# Patient Record
Sex: FEMALE | Race: OTHER | NOT HISPANIC OR LATINO | URBAN - METROPOLITAN AREA
[De-identification: names, ages, dates, MRNs, and addresses within clinical notes are randomized per-mention and may not be internally consistent; named-entity substitution may affect disease eponyms.]

---

## 2018-07-21 ENCOUNTER — INPATIENT (INPATIENT)
Facility: HOSPITAL | Age: 53
LOS: 3 days | Discharge: HOME | DRG: 313 | End: 2018-07-25
Attending: INTERNAL MEDICINE | Admitting: INTERNAL MEDICINE
Payer: MEDICAID

## 2018-07-21 VITALS
HEART RATE: 54 BPM | RESPIRATION RATE: 16 BRPM | HEIGHT: 68 IN | DIASTOLIC BLOOD PRESSURE: 75 MMHG | SYSTOLIC BLOOD PRESSURE: 156 MMHG | TEMPERATURE: 98 F | WEIGHT: 225.09 LBS | OXYGEN SATURATION: 98 %

## 2018-07-21 DIAGNOSIS — R07.9 CHEST PAIN, UNSPECIFIED: ICD-10-CM

## 2018-07-21 LAB
BASOPHILS # BLD AUTO: 0.1 K/UL — SIGNIFICANT CHANGE UP (ref 0–0.2)
BASOPHILS NFR BLD AUTO: 2.5 % — HIGH (ref 0–2)
CHLORIDE SERPL-SCNC: 109 MMOL/L — HIGH (ref 96–108)
EOSINOPHIL # BLD AUTO: 0.1 K/UL — SIGNIFICANT CHANGE UP (ref 0–0.5)
EOSINOPHIL NFR BLD AUTO: 2.7 % — SIGNIFICANT CHANGE UP (ref 0–6)
HCT VFR BLD CALC: 41.7 % — SIGNIFICANT CHANGE UP (ref 34.5–45)
HGB BLD-MCNC: 13.2 G/DL — SIGNIFICANT CHANGE UP (ref 11.5–15.5)
LYMPHOCYTES # BLD AUTO: 2.6 K/UL — SIGNIFICANT CHANGE UP (ref 1–3.3)
LYMPHOCYTES # BLD AUTO: 49 % — HIGH (ref 13–44)
MCHC RBC-ENTMCNC: 27.2 PG — SIGNIFICANT CHANGE UP (ref 27–34)
MCHC RBC-ENTMCNC: 31.8 GM/DL — LOW (ref 32–36)
MCV RBC AUTO: 85.5 FL — SIGNIFICANT CHANGE UP (ref 80–100)
MONOCYTES # BLD AUTO: 0.6 K/UL — SIGNIFICANT CHANGE UP (ref 0–0.9)
MONOCYTES NFR BLD AUTO: 11.1 % — SIGNIFICANT CHANGE UP (ref 2–14)
NEUTROPHILS # BLD AUTO: 1.9 K/UL — SIGNIFICANT CHANGE UP (ref 1.8–7.4)
NEUTROPHILS NFR BLD AUTO: 34.7 % — LOW (ref 43–77)
PLATELET # BLD AUTO: 263 K/UL — SIGNIFICANT CHANGE UP (ref 150–400)
POTASSIUM SERPL-MCNC: 3.9 MMOL/L — SIGNIFICANT CHANGE UP (ref 3.5–5.3)
POTASSIUM SERPL-SCNC: 3.9 MMOL/L — SIGNIFICANT CHANGE UP (ref 3.5–5.3)
RBC # BLD: 4.87 M/UL — SIGNIFICANT CHANGE UP (ref 3.8–5.2)
RBC # FLD: 12.1 % — SIGNIFICANT CHANGE UP (ref 10.3–14.5)
SODIUM SERPL-SCNC: 140 MMOL/L — SIGNIFICANT CHANGE UP (ref 135–145)
WBC # BLD: 5.4 K/UL — SIGNIFICANT CHANGE UP (ref 3.8–10.5)
WBC # FLD AUTO: 5.4 K/UL — SIGNIFICANT CHANGE UP (ref 3.8–10.5)

## 2018-07-21 PROCEDURE — 71045 X-RAY EXAM CHEST 1 VIEW: CPT | Mod: 26

## 2018-07-21 PROCEDURE — 99285 EMERGENCY DEPT VISIT HI MDM: CPT | Mod: 25

## 2018-07-21 RX ORDER — ASPIRIN/CALCIUM CARB/MAGNESIUM 324 MG
162 TABLET ORAL DAILY
Qty: 0 | Refills: 0 | Status: DISCONTINUED | OUTPATIENT
Start: 2018-07-21 | End: 2018-07-22

## 2018-07-21 RX ORDER — NITROGLYCERIN 6.5 MG
0.4 CAPSULE, EXTENDED RELEASE ORAL
Qty: 0 | Refills: 0 | Status: DISCONTINUED | OUTPATIENT
Start: 2018-07-21 | End: 2018-07-25

## 2018-07-21 RX ADMIN — Medication 0.4 MILLIGRAM(S): at 23:36

## 2018-07-21 RX ADMIN — Medication 162 MILLIGRAM(S): at 23:23

## 2018-07-21 NOTE — ED PROVIDER NOTE - OBJECTIVE STATEMENT
53 y/o F pt with no significant PMHx and no significant PSHx presents to ED c/o intermittent episodes of CP x1 week. Pt describes episodes as lasting anywhere from 15 minutes to 2 hours and radiating into her jaw. Pt notes her father  of MI at the age of 52. Pt reports [place], and is otherwise normal. Pt denies nausea, diaphoresis, pleuritic pain, fever, chills, cough, or any other complaints. NKDA

## 2018-07-22 DIAGNOSIS — Z29.9 ENCOUNTER FOR PROPHYLACTIC MEASURES, UNSPECIFIED: ICD-10-CM

## 2018-07-22 DIAGNOSIS — J45.909 UNSPECIFIED ASTHMA, UNCOMPLICATED: ICD-10-CM

## 2018-07-22 DIAGNOSIS — R07.9 CHEST PAIN, UNSPECIFIED: ICD-10-CM

## 2018-07-22 LAB
24R-OH-CALCIDIOL SERPL-MCNC: 29.1 NG/ML — LOW (ref 30–80)
ALBUMIN SERPL ELPH-MCNC: 3.7 G/DL — SIGNIFICANT CHANGE UP (ref 3.5–5)
ALBUMIN SERPL ELPH-MCNC: 3.9 G/DL — SIGNIFICANT CHANGE UP (ref 3.5–5)
ALP SERPL-CCNC: 81 U/L — SIGNIFICANT CHANGE UP (ref 40–120)
ALP SERPL-CCNC: 86 U/L — SIGNIFICANT CHANGE UP (ref 40–120)
ALT FLD-CCNC: 23 U/L DA — SIGNIFICANT CHANGE UP (ref 10–60)
ALT FLD-CCNC: 24 U/L DA — SIGNIFICANT CHANGE UP (ref 10–60)
ANION GAP SERPL CALC-SCNC: 6 MMOL/L — SIGNIFICANT CHANGE UP (ref 5–17)
ANION GAP SERPL CALC-SCNC: 8 MMOL/L — SIGNIFICANT CHANGE UP (ref 5–17)
AST SERPL-CCNC: 17 U/L — SIGNIFICANT CHANGE UP (ref 10–40)
AST SERPL-CCNC: 20 U/L — SIGNIFICANT CHANGE UP (ref 10–40)
BILIRUB SERPL-MCNC: 0.4 MG/DL — SIGNIFICANT CHANGE UP (ref 0.2–1.2)
BILIRUB SERPL-MCNC: 0.6 MG/DL — SIGNIFICANT CHANGE UP (ref 0.2–1.2)
BUN SERPL-MCNC: 12 MG/DL — SIGNIFICANT CHANGE UP (ref 7–18)
BUN SERPL-MCNC: 14 MG/DL — SIGNIFICANT CHANGE UP (ref 7–18)
CALCIUM SERPL-MCNC: 8.9 MG/DL — SIGNIFICANT CHANGE UP (ref 8.4–10.5)
CALCIUM SERPL-MCNC: 9.1 MG/DL — SIGNIFICANT CHANGE UP (ref 8.4–10.5)
CHLORIDE SERPL-SCNC: 109 MMOL/L — HIGH (ref 96–108)
CHOLEST SERPL-MCNC: 201 MG/DL — HIGH (ref 10–199)
CK MB BLD-MCNC: 0.9 % — SIGNIFICANT CHANGE UP (ref 0–3.5)
CK MB CFR SERPL CALC: 1.3 NG/ML — SIGNIFICANT CHANGE UP (ref 0–3.6)
CK SERPL-CCNC: 143 U/L — SIGNIFICANT CHANGE UP (ref 21–215)
CO2 SERPL-SCNC: 23 MMOL/L — SIGNIFICANT CHANGE UP (ref 22–31)
CO2 SERPL-SCNC: 27 MMOL/L — SIGNIFICANT CHANGE UP (ref 22–31)
CREAT SERPL-MCNC: 1.02 MG/DL — SIGNIFICANT CHANGE UP (ref 0.5–1.3)
CREAT SERPL-MCNC: 1.09 MG/DL — SIGNIFICANT CHANGE UP (ref 0.5–1.3)
FOLATE SERPL-MCNC: 15.3 NG/ML — SIGNIFICANT CHANGE UP
GLUCOSE SERPL-MCNC: 102 MG/DL — HIGH (ref 70–99)
GLUCOSE SERPL-MCNC: 114 MG/DL — HIGH (ref 70–99)
HBA1C BLD-MCNC: 5.9 % — HIGH (ref 4–5.6)
HDLC SERPL-MCNC: 41 MG/DL — SIGNIFICANT CHANGE UP (ref 40–125)
INR BLD: 1.05 RATIO — SIGNIFICANT CHANGE UP (ref 0.88–1.16)
LIPID PNL WITH DIRECT LDL SERPL: 148 MG/DL — SIGNIFICANT CHANGE UP
MAGNESIUM SERPL-MCNC: 2.4 MG/DL — SIGNIFICANT CHANGE UP (ref 1.6–2.6)
NT-PROBNP SERPL-SCNC: 38 PG/ML — SIGNIFICANT CHANGE UP (ref 0–125)
PHOSPHATE SERPL-MCNC: 3.9 MG/DL — SIGNIFICANT CHANGE UP (ref 2.5–4.5)
POTASSIUM SERPL-MCNC: 3.9 MMOL/L — SIGNIFICANT CHANGE UP (ref 3.5–5.3)
POTASSIUM SERPL-SCNC: 3.9 MMOL/L — SIGNIFICANT CHANGE UP (ref 3.5–5.3)
PROT SERPL-MCNC: 7.5 G/DL — SIGNIFICANT CHANGE UP (ref 6–8.3)
PROT SERPL-MCNC: 8.1 G/DL — SIGNIFICANT CHANGE UP (ref 6–8.3)
PROTHROM AB SERPL-ACNC: 11.5 SEC — SIGNIFICANT CHANGE UP (ref 9.8–12.7)
SODIUM SERPL-SCNC: 142 MMOL/L — SIGNIFICANT CHANGE UP (ref 135–145)
T3FREE SERPL-MCNC: 2.56 PG/ML — SIGNIFICANT CHANGE UP (ref 1.8–4.6)
T4 FREE SERPL-MCNC: 1.3 NG/DL — SIGNIFICANT CHANGE UP (ref 0.9–1.8)
TOTAL CHOLESTEROL/HDL RATIO MEASUREMENT: 4.9 RATIO — SIGNIFICANT CHANGE UP (ref 3.3–7.1)
TRIGL SERPL-MCNC: 59 MG/DL — SIGNIFICANT CHANGE UP (ref 10–149)
TROPONIN I SERPL-MCNC: <0.015 NG/ML — SIGNIFICANT CHANGE UP (ref 0–0.04)
TROPONIN I SERPL-MCNC: <0.015 NG/ML — SIGNIFICANT CHANGE UP (ref 0–0.04)
TSH SERPL-MCNC: 1.34 UU/ML — SIGNIFICANT CHANGE UP (ref 0.34–4.82)
VIT B12 SERPL-MCNC: 414 PG/ML — SIGNIFICANT CHANGE UP (ref 232–1245)

## 2018-07-22 RX ORDER — ATORVASTATIN CALCIUM 80 MG/1
40 TABLET, FILM COATED ORAL AT BEDTIME
Qty: 0 | Refills: 0 | Status: DISCONTINUED | OUTPATIENT
Start: 2018-07-22 | End: 2018-07-25

## 2018-07-22 RX ORDER — ASPIRIN/CALCIUM CARB/MAGNESIUM 324 MG
81 TABLET ORAL DAILY
Qty: 0 | Refills: 0 | Status: DISCONTINUED | OUTPATIENT
Start: 2018-07-22 | End: 2018-07-25

## 2018-07-22 RX ORDER — PANTOPRAZOLE SODIUM 20 MG/1
40 TABLET, DELAYED RELEASE ORAL
Qty: 0 | Refills: 0 | Status: DISCONTINUED | OUTPATIENT
Start: 2018-07-22 | End: 2018-07-25

## 2018-07-22 RX ORDER — IPRATROPIUM/ALBUTEROL SULFATE 18-103MCG
3 AEROSOL WITH ADAPTER (GRAM) INHALATION EVERY 6 HOURS
Qty: 0 | Refills: 0 | Status: DISCONTINUED | OUTPATIENT
Start: 2018-07-22 | End: 2018-07-25

## 2018-07-22 RX ORDER — METOPROLOL TARTRATE 50 MG
12.5 TABLET ORAL
Qty: 0 | Refills: 0 | Status: DISCONTINUED | OUTPATIENT
Start: 2018-07-22 | End: 2018-07-22

## 2018-07-22 RX ADMIN — PANTOPRAZOLE SODIUM 40 MILLIGRAM(S): 20 TABLET, DELAYED RELEASE ORAL at 07:40

## 2018-07-22 RX ADMIN — Medication 81 MILLIGRAM(S): at 12:20

## 2018-07-22 RX ADMIN — Medication 12.5 MILLIGRAM(S): at 05:25

## 2018-07-22 NOTE — ED ADULT NURSE NOTE - ED STAT RN HANDOFF DETAILS
Patient was endorsed to SAUL Ferguson in stable condition and no acute distress. Patient was endorsed to SAUL Ferguson in stable condition and no acute distress. MD Moreno was paged regarding dropped in HR. Patient is asymptomatic .

## 2018-07-22 NOTE — H&P ADULT - NSHPPHYSICALEXAM_GEN_ALL_CORE
Vital Signs Last 24 Hrs  T(C): 36.6 (22 Jul 2018 00:01), Max: 36.7 (21 Jul 2018 23:08)  T(F): 97.8 (22 Jul 2018 00:01), Max: 98 (21 Jul 2018 23:08)  HR: 48 (22 Jul 2018 00:01) (48 - 54)  BP: 113/47 (22 Jul 2018 00:01) (113/47 - 156/75)  BP(mean): --  RR: 16 (22 Jul 2018 00:01) (16 - 16)  SpO2: 100% (22 Jul 2018 00:01) (98% - 100%)

## 2018-07-22 NOTE — H&P ADULT - NSHPSOCIALHISTORY_GEN_ALL_CORE
patient lives in [facility] , [place] to [place] visit family, occasional drinker, non smoker and non drug user patient lives in [facility], has 3 kids, came to [place] family, occasional drinker, non smoker and non drug user

## 2018-07-22 NOTE — H&P ADULT - PROBLEM SELECTOR PLAN 3
IMPROVE VTE Individual Risk Assessment  RISK                                                                Points  [  ] Previous VTE                                                  3  [  ] Thrombophilia                                               2  [  ] Lower limb paralysis                                      2        (unable to hold up >15 seconds)    [  ] Current Cancer                                              2         (within 6 months)  [  ] Immobilization > 24 hrs                                1  [  ] ICU/CCU stay > 24 hours                              1  [  ] Age > 60                                                      1    IMPROVE VTE Score _0_______  no need for VTE prophylaxis

## 2018-07-22 NOTE — H&P ADULT - HISTORY OF PRESENT ILLNESS
53 y/o F ambulatory pt  with no significant PMHx and no significant PSHx presents to ED c/o intermittent episodes of CP x1 week. Pt describes episodes as lasting anywhere from 15 minutes to 2 hours and radiating into her jaw. Pt notes her father  of MI at the age of 52. Pt reports [place], and is otherwise normal. Pt denies nausea, diaphoresis, pleuritic pain, fever, chills, cough, or any other complaints. 51 y/o F ambulatory pt, originally from [facility] with PMHx of Asthma and HLD and no significant PSHx presents to ED c/o intermittent episodes of CP x1 week. Pt describes chest pain as on and off, lasting 15mins to 1 hr, burning in nature,10/10 initially now 4/10,  located in the centre of the chest and sometimes radiating into her jaw and left shoulder, non aggravating  with inspiration and strenuous activity. Patient pinpoints tender spot on the centre of the chest. Pt sometimes feels SOB, palpitations but not associated with pain. Pt denies nausea, diaphoresis,fever, chills, cough, or any other complaints.she had this kind of pain before for which she got her cardiac workup done in [place] including ECHO and stress test which were normal as per the Pt.Her father  of MI at the age of 52. Pt does not take any medications except for Vit D and inhaler pump. 53 y/o F ambulatory pt, originally from [facility] with PMHx of Asthma and HLD and no significant PSHx presents to ED c/o intermittent episodes of CP x1 week. Pt describes chest pain as on and off, lasting 15mins to 1 hr, burning in nature,10/10 initially now 4/10,  located in the centre of the chest and sometimes radiating into her jaw and left shoulder, non aggravating  with inspiration and strenuous activity. Patient pinpoints tender spot on the centre of the chest. Pt sometimes feels SOB, palpitations but not associated with pain. Pt denies nausea, diaphoresis,fever, chills, cough, or any other complaints.she had this kind of pain before for which she got her cardiac workup done in [place] including ECHO and stress test which were normal as per the Pt(no records though).Her father  of MI at the age of 52. Pt does not take any medications except for Vit D and inhaler pump.

## 2018-07-22 NOTE — H&P ADULT - MUSCULOSKELETAL
detailed exam no joint warmth/ROM intact/no joint swelling/no joint erythema/normal strength/no calf tenderness

## 2018-07-22 NOTE — H&P ADULT - PROBLEM SELECTOR PLAN 1
-R/O ACS  -Atypical chest pain, burning ,chest tenderness,  -ECG NSS with bradycardia  - 1 Trop I -ve   - started on  ASA 81 mg, low dose lopressor, Lipitor  -TELE monitoring  -F/u morning labs  - f/u ECHO -R/O ACS  -Atypical chest pain, burning ,chest tenderness,  -ECG NSR with bradycardia  - 1 Trop I -ve   - started on  ASA 81 mg, low dose lopressor, Lipitor  -TELE monitoring  -F/u morning labs  - f/u ECHO -R/O ACS  -Atypical chest pain, burning ,chest tenderness,  -ECG NSR with bradycardia  - 1 Trop I -ve   - started on  ASA 81 mg, low dose lopressor, Lipitor  -TELE monitoring  -F/u morning labs  - f/u ECHO  - Cardio Dr. Khalil

## 2018-07-22 NOTE — H&P ADULT - ASSESSMENT
51 y/o F ambulatory pt, originally from [facility] with PMHx of Asthma and HLD and no significant PSHx presents to ED c/o intermittent episodes of CP x1 week.  ED course; Pt stable    Vitals WNL  EKG : NSS with bradycardia  Chest Xray :  Aspirin 162 mg , nitroglycerin sublingual given 51 y/o F ambulatory pt, originally from [facility] with PMHx of Asthma and HLD and no significant PSHx presents to ED c/o intermittent episodes of CP x1 week.  ED course; Pt stable    Vitals WNL  EKG : NSR with bradycardia  Chest Xray :  Aspirin 162 mg , nitroglycerin sublingual given

## 2018-07-22 NOTE — CONSULT NOTE ADULT - SUBJECTIVE AND OBJECTIVE BOX
Requesting Physician : Dr. Paul    Reason for Consultation: Chest pain     HISTORY OF PRESENT ILLNESS:  51 yo F with history of asthma and HLD who is being evaluated for chest pain.  The patient was admitted with 1 week history of intermittent chest pain.  The pain is described as burning and sharp.  It is not related to exertion and is occasionally worse after eating.  She has had similar complaints in the past at which time an echo and NST were performed at an outside hospital. The patient was told it was normal.  No associated symptoms such as dyspnea, palpitations, diaphoresis, or n/v.        PAST MEDICAL & SURGICAL HISTORY:  Hyperlipidemia  Asthma  No significant past surgical history          MEDICATIONS:  MEDICATIONS  (STANDING):  aspirin  chewable 81 milliGRAM(s) Oral daily  atorvastatin 40 milliGRAM(s) Oral at bedtime  pantoprazole    Tablet 40 milliGRAM(s) Oral before breakfast      Allergies    No Known Allergies    Intolerances        FAMILY HISTORY:  Family history of MI (myocardial infarction) (Father): Father  of MI at 52    Non-contributary for premature coronary disease or sudden cardiac death    SOCIAL HISTORY:    [x ] Non-smoker  [ ] Smoker  [ ] Alcohol      REVIEW OF SYSTEMS:  [x ]chest pain  [  ]shortness of breath  [  ]palpitations  [  ]syncope  [ ]near syncope [ ]upper extremity weakness   [ ] lower extremity weakness  [  ]diplopia  [  ]altered mental status   [  ]fevers  [ ]chills [ ]nausea  [ ]vomitting  [  ]dysphagia    [ ]abdominal pain  [ ]melena  [ ]BRBPR    [  ]epistaxis  [  ]rash    [ ]lower extremity edema        [x ] All others negative	  [ ] Unable to obtain    PHYSICAL EXAM:  T(C): 36.9 (18 @ 07:28), Max: 36.9 (18 @ 07:28)  HR: 46 (18 @ 07:28) (45 - 85)  BP: 129/72 (18 @ 07:28) (113/47 - 156/75)  RR: 17 (18 @ 07:28) (16 - 17)  SpO2: 100% (18 @ 07:28) (98% - 100%)  Wt(kg): --  I&O's Summary        HEENT:   Normal oral mucosa, PERRL, EOMI	  Lymphatic: No lymphadenopathy , no edema  Cardiovascular: Normal S1 S2, RRR,No JVD, No murmurs , Peripheral pulses palpable 2+ bilaterally  Respiratory: Lungs clear to auscultation, normal effort 	  Gastrointestinal:  Soft, Non-tender, + BS	  Skin: No rashes, No ecchymoses, No cyanosis, warm to touch  Musculoskeletal: Normal range of motion, normal strength  Psychiatry:  Mood & affect appropriate      TELEMETRY: SR	    ECG:  SB	  RADIOLOGY:  OTHER:     DIAGNOSTIC TESTING:  [ ] Echocardiogram:  [ ]  Catheterization:  [ ] Stress Test:    	  	  LABS:	 	    CARDIAC MARKERS:  CARDIAC MARKERS ( 2018 06:01 )  <0.015 ng/mL / x     / 143 U/L / x     / 1.3 ng/mL  CARDIAC MARKERS ( 2018 23:38 )  <0.015 ng/mL / x     / x     / x     / x                                  13.2   5.4   )-----------( 263      ( 2018 23:38 )             41.7         142  |  109<H>  |  12  ----------------------------<  102<H>  3.9   |  27  |  1.02    Ca    9.1      2018 06:01  Phos  3.9       Mg     2.4         TPro  7.5  /  Alb  3.7  /  TBili  0.6  /  DBili  x   /  AST  17  /  ALT  23  /  AlkPhos  81      proBNP: Serum Pro-Brain Natriuretic Peptide: 38 pg/mL ( @ 06:01)    Lipid Profile:   HgA1c:   TSH: Thyroid Stimulating Hormone, Serum: 1.34 uU/mL ( @ 06:01)      ASSESSMENT/PLAN: 	52yFemale with history of HLD, asthma, FHx of CAD admitted with chest pain.   -Pain somewhat atypical and occurs usually after eating  -would olga lidia with 3 sets CE  -check TTE  -obtain prior NST  -further workup pending above    Anshu Price MD

## 2018-07-23 LAB
ALBUMIN SERPL ELPH-MCNC: 3.6 G/DL — SIGNIFICANT CHANGE UP (ref 3.5–5)
ALP SERPL-CCNC: 81 U/L — SIGNIFICANT CHANGE UP (ref 40–120)
ALT FLD-CCNC: 23 U/L DA — SIGNIFICANT CHANGE UP (ref 10–60)
ANION GAP SERPL CALC-SCNC: 8 MMOL/L — SIGNIFICANT CHANGE UP (ref 5–17)
AST SERPL-CCNC: 13 U/L — SIGNIFICANT CHANGE UP (ref 10–40)
BASOPHILS # BLD AUTO: 0.1 K/UL — SIGNIFICANT CHANGE UP (ref 0–0.2)
BASOPHILS NFR BLD AUTO: 1.4 % — SIGNIFICANT CHANGE UP (ref 0–2)
BILIRUB SERPL-MCNC: 0.8 MG/DL — SIGNIFICANT CHANGE UP (ref 0.2–1.2)
BUN SERPL-MCNC: 13 MG/DL — SIGNIFICANT CHANGE UP (ref 7–18)
CALCIUM SERPL-MCNC: 9.1 MG/DL — SIGNIFICANT CHANGE UP (ref 8.4–10.5)
CHLORIDE SERPL-SCNC: 107 MMOL/L — SIGNIFICANT CHANGE UP (ref 96–108)
CO2 SERPL-SCNC: 26 MMOL/L — SIGNIFICANT CHANGE UP (ref 22–31)
CREAT SERPL-MCNC: 1.04 MG/DL — SIGNIFICANT CHANGE UP (ref 0.5–1.3)
EOSINOPHIL # BLD AUTO: 0.2 K/UL — SIGNIFICANT CHANGE UP (ref 0–0.5)
EOSINOPHIL NFR BLD AUTO: 3.9 % — SIGNIFICANT CHANGE UP (ref 0–6)
GLUCOSE SERPL-MCNC: 97 MG/DL — SIGNIFICANT CHANGE UP (ref 70–99)
HCT VFR BLD CALC: 40.5 % — SIGNIFICANT CHANGE UP (ref 34.5–45)
HGB BLD-MCNC: 12.7 G/DL — SIGNIFICANT CHANGE UP (ref 11.5–15.5)
LYMPHOCYTES # BLD AUTO: 2.3 K/UL — SIGNIFICANT CHANGE UP (ref 1–3.3)
LYMPHOCYTES # BLD AUTO: 48.8 % — HIGH (ref 13–44)
MAGNESIUM SERPL-MCNC: 2.3 MG/DL — SIGNIFICANT CHANGE UP (ref 1.6–2.6)
MCHC RBC-ENTMCNC: 27.3 PG — SIGNIFICANT CHANGE UP (ref 27–34)
MCHC RBC-ENTMCNC: 31.4 GM/DL — LOW (ref 32–36)
MCV RBC AUTO: 86.8 FL — SIGNIFICANT CHANGE UP (ref 80–100)
MONOCYTES # BLD AUTO: 0.5 K/UL — SIGNIFICANT CHANGE UP (ref 0–0.9)
MONOCYTES NFR BLD AUTO: 10.8 % — SIGNIFICANT CHANGE UP (ref 2–14)
NEUTROPHILS # BLD AUTO: 1.6 K/UL — LOW (ref 1.8–7.4)
NEUTROPHILS NFR BLD AUTO: 35.1 % — LOW (ref 43–77)
PHOSPHATE SERPL-MCNC: 4.1 MG/DL — SIGNIFICANT CHANGE UP (ref 2.5–4.5)
PLATELET # BLD AUTO: 278 K/UL — SIGNIFICANT CHANGE UP (ref 150–400)
POTASSIUM SERPL-MCNC: 3.8 MMOL/L — SIGNIFICANT CHANGE UP (ref 3.5–5.3)
POTASSIUM SERPL-SCNC: 3.8 MMOL/L — SIGNIFICANT CHANGE UP (ref 3.5–5.3)
PROT SERPL-MCNC: 7.8 G/DL — SIGNIFICANT CHANGE UP (ref 6–8.3)
RBC # BLD: 4.66 M/UL — SIGNIFICANT CHANGE UP (ref 3.8–5.2)
RBC # FLD: 12 % — SIGNIFICANT CHANGE UP (ref 10.3–14.5)
SODIUM SERPL-SCNC: 141 MMOL/L — SIGNIFICANT CHANGE UP (ref 135–145)
WBC # BLD: 4.6 K/UL — SIGNIFICANT CHANGE UP (ref 3.8–10.5)
WBC # FLD AUTO: 4.6 K/UL — SIGNIFICANT CHANGE UP (ref 3.8–10.5)

## 2018-07-23 RX ORDER — ERGOCALCIFEROL 1.25 MG/1
50000 CAPSULE ORAL
Qty: 0 | Refills: 0 | Status: DISCONTINUED | OUTPATIENT
Start: 2018-07-23 | End: 2018-07-25

## 2018-07-23 RX ADMIN — PANTOPRAZOLE SODIUM 40 MILLIGRAM(S): 20 TABLET, DELAYED RELEASE ORAL at 07:18

## 2018-07-23 RX ADMIN — ERGOCALCIFEROL 50000 UNIT(S): 1.25 CAPSULE ORAL at 13:47

## 2018-07-23 RX ADMIN — Medication 81 MILLIGRAM(S): at 11:58

## 2018-07-23 RX ADMIN — ATORVASTATIN CALCIUM 40 MILLIGRAM(S): 80 TABLET, FILM COATED ORAL at 23:30

## 2018-07-23 NOTE — PROGRESS NOTE ADULT - SUBJECTIVE AND OBJECTIVE BOX
Patient is a 52y old  Female who presents with a chief complaint of chest pain (22 Jul 2018 12:56)/dizziness, on tele bradycardia, pending ECHO      INTERVAL HPI/OVERNIGHT EVENTS:  T(C): 36.8 (07-23-18 @ 07:47), Max: 37.2 (07-22-18 @ 15:44)  HR: 80 (07-23-18 @ 07:47) (46 - 80)  BP: 117/73 (07-23-18 @ 07:47) (102/46 - 121/64)  RR: 18 (07-23-18 @ 07:47) (17 - 18)  SpO2: 98% (07-23-18 @ 07:47) (98% - 100%)  Wt(kg): --    LABS:                        12.7   4.6   )-----------( 278      ( 23 Jul 2018 06:24 )             40.5     07-23    141  |  107  |  13  ----------------------------<  97  3.8   |  26  |  1.04    Ca    9.1      23 Jul 2018 06:24  Phos  4.1     07-23  Mg     2.3     07-23    TPro  7.8  /  Alb  3.6  /  TBili  0.8  /  DBili  x   /  AST  13  /  ALT  23  /  AlkPhos  81  07-23    PT/INR - ( 22 Jul 2018 06:01 )   PT: 11.5 sec;   INR: 1.05 ratio             CAPILLARY BLOOD GLUCOSE            RADIOLOGY & ADDITIONAL TESTS:    Consultant(s) Notes Reviewed:  [x ] YES  [ ] NO    PHYSICAL EXAM:  GENERAL: well built, well nourished  HEAD:  Atraumatic, Normocephalic  EYES: EOMI, PERRLA, conjunctiva and sclera clear  ENT: No tonsillar erythema, exudates, or enlargement; Moist mucous membranes, Good dentition, No lesions  NECK: Supple, No JVD, Normal thyroid, no enlarged nodes  NERVOUS SYSTEM:  Alert & Oriented X3, Good concentration; Motor Strength 5/5 B/L upper and lower extremities; DTRs 2+ intact and symmetric, sensory intact  CHEST/LUNG: B/L good air entry; No rales, rhonchi, or wheezing  HEART: S1S2 mild sharee  ABDOMEN: Soft, Nontender, Nondistended; Bowel sounds present  EXTREMITIES:  2+ Peripheral Pulses, No clubbing, cyanosis, or edema  LYMPH: No lymphadenopathy noted  SKIN: No rashes or lesions    Care Discussed with Consultants/Other Providers [ x] YES  [ ] NO

## 2018-07-23 NOTE — ED ADULT NURSE REASSESSMENT NOTE - NS ED NURSE REASSESS COMMENT FT1
pt ambulates at [facility] with cp. states pain only with movement. cardiac rhythm sharee.
MD Moreno was paged regarding dropped in heart rate to 30"s and then 40's. Patient denies dizziness , lightheadedness and SOB.
pt c/o chest pain 7/10 at this time, sent messages to Night intern Johanna Ellsworth and Veena Chino about this matter, will follow up.
pt received From night Shift pt denies any distress

## 2018-07-23 NOTE — PROGRESS NOTE ADULT - ASSESSMENT
52 yr old female admit hospital for chest pain/dizziness, troponin times two negative, pending ECHO, cardiology follow up, continue tele, for bradycardia, so far no evidence of long pause. ECHO 2/18 normal(?), try to get NST report.

## 2018-07-23 NOTE — PROGRESS NOTE ADULT - SUBJECTIVE AND OBJECTIVE BOX
Subjective:  pt seen and examined, no complaints on exam.   pt sitting in stretcher in nad    ALBUTerol/ipratropium for Nebulization 3 milliLiter(s) Nebulizer every 6 hours PRN  aspirin  chewable 81 milliGRAM(s) Oral daily  atorvastatin 40 milliGRAM(s) Oral at bedtime  nitroglycerin     SubLingual 0.4 milliGRAM(s) SubLingual every 5 minutes PRN  pantoprazole    Tablet 40 milliGRAM(s) Oral before breakfast                            12.7   4.6   )-----------( 278      ( 23 Jul 2018 06:24 )             40.5       Hemoglobin: 12.7 g/dL (07-23 @ 06:24)  Hemoglobin: 13.2 g/dL (07-21 @ 23:38)      07-23    141  |  107  |  13  ----------------------------<  97  3.8   |  26  |  1.04    Ca    9.1      23 Jul 2018 06:24  Phos  4.1     07-23  Mg     2.3     07-23    TPro  7.8  /  Alb  3.6  /  TBili  0.8  /  DBili  x   /  AST  13  /  ALT  23  /  AlkPhos  81  07-23    Creatinine Trend: 1.04<--, 1.02<--, 1.09<--    COAGS:     CARDIAC MARKERS ( 22 Jul 2018 06:01 )  <0.015 ng/mL / x     / 143 U/L / x     / 1.3 ng/mL  CARDIAC MARKERS ( 21 Jul 2018 23:38 )  <0.015 ng/mL / x     / x     / x     / x            T(C): 36.8 (07-23-18 @ 06:45), Max: 37.2 (07-22-18 @ 15:44)  HR: 51 (07-23-18 @ 06:45) (46 - 62)  BP: 106/62 (07-23-18 @ 06:45) (102/46 - 121/64)  RR: 18 (07-23-18 @ 06:45) (17 - 18)  SpO2: 99% (07-23-18 @ 06:45) (98% - 100%)  Wt(kg): --    I&O's Summary      Appearance: Normal	  HEENT:   Normal oral mucosa, PERRL, EOMI	  Lymphatic: No lymphadenopathy , no edema  Cardiovascular: Normal S1 S2, No JVD, No murmurs , Peripheral pulses palpable 2+ bilaterally  Respiratory: Lungs clear to auscultation, normal effort 	  Gastrointestinal:  Soft, Non-tender, + BS	  Skin: No rashes, No ecchymoses, No cyanosis, warm to touch  Musculoskeletal: Normal range of motion, normal strength  Psychiatry:  Mood & affect appropriate    TELEMETRY: 	  NSR / SB     DIAGNOSTIC TESTING:  [ ] Echocardiogram:   [ ]  Catheterization:  [ ] Stress Test:    OTHER: 	        ASSESSMENT/PLAN: 	52y Female  with history of HLD, asthma, FHx of CAD admitted with chest pain.     - Cardiac marker neg so far.  - tele stable no arrythmias overnight on tele  - cont ASA/ statin  -  GI / DVT prophylaxis,  keep K>4, mag >2.0   -Pain somewhat atypical and occurs usually after eating  -check TTE  -obtain prior NST  -further workup pending above  D/W Dr Price

## 2018-07-24 LAB
ANION GAP SERPL CALC-SCNC: 11 MMOL/L — SIGNIFICANT CHANGE UP (ref 5–17)
APPEARANCE UR: CLEAR — SIGNIFICANT CHANGE UP
BILIRUB UR-MCNC: NEGATIVE — SIGNIFICANT CHANGE UP
BUN SERPL-MCNC: 15 MG/DL — SIGNIFICANT CHANGE UP (ref 7–18)
CALCIUM SERPL-MCNC: 9.4 MG/DL — SIGNIFICANT CHANGE UP (ref 8.4–10.5)
CHLORIDE SERPL-SCNC: 107 MMOL/L — SIGNIFICANT CHANGE UP (ref 96–108)
CO2 SERPL-SCNC: 22 MMOL/L — SIGNIFICANT CHANGE UP (ref 22–31)
COLOR SPEC: YELLOW — SIGNIFICANT CHANGE UP
CREAT SERPL-MCNC: 1 MG/DL — SIGNIFICANT CHANGE UP (ref 0.5–1.3)
D DIMER BLD IA.RAPID-MCNC: <150 NG/ML DDU — SIGNIFICANT CHANGE UP
DIFF PNL FLD: NEGATIVE — SIGNIFICANT CHANGE UP
GLUCOSE SERPL-MCNC: 96 MG/DL — SIGNIFICANT CHANGE UP (ref 70–99)
GLUCOSE UR QL: NEGATIVE — SIGNIFICANT CHANGE UP
HCG SERPL-ACNC: 5 MIU/ML — SIGNIFICANT CHANGE UP
HCT VFR BLD CALC: 40.5 % — SIGNIFICANT CHANGE UP (ref 34.5–45)
HGB BLD-MCNC: 12.9 G/DL — SIGNIFICANT CHANGE UP (ref 11.5–15.5)
KETONES UR-MCNC: NEGATIVE — SIGNIFICANT CHANGE UP
LEUKOCYTE ESTERASE UR-ACNC: ABNORMAL
MCHC RBC-ENTMCNC: 27.5 PG — SIGNIFICANT CHANGE UP (ref 27–34)
MCHC RBC-ENTMCNC: 31.8 GM/DL — LOW (ref 32–36)
MCV RBC AUTO: 86.4 FL — SIGNIFICANT CHANGE UP (ref 80–100)
NITRITE UR-MCNC: NEGATIVE — SIGNIFICANT CHANGE UP
PH UR: 7 — SIGNIFICANT CHANGE UP (ref 5–8)
PLATELET # BLD AUTO: 280 K/UL — SIGNIFICANT CHANGE UP (ref 150–400)
POTASSIUM SERPL-MCNC: 3.9 MMOL/L — SIGNIFICANT CHANGE UP (ref 3.5–5.3)
POTASSIUM SERPL-SCNC: 3.9 MMOL/L — SIGNIFICANT CHANGE UP (ref 3.5–5.3)
PROT UR-MCNC: NEGATIVE — SIGNIFICANT CHANGE UP
RBC # BLD: 4.68 M/UL — SIGNIFICANT CHANGE UP (ref 3.8–5.2)
RBC # FLD: 11.8 % — SIGNIFICANT CHANGE UP (ref 10.3–14.5)
SODIUM SERPL-SCNC: 140 MMOL/L — SIGNIFICANT CHANGE UP (ref 135–145)
SP GR SPEC: 1 — LOW (ref 1.01–1.02)
UROBILINOGEN FLD QL: NEGATIVE — SIGNIFICANT CHANGE UP
WBC # BLD: 4.5 K/UL — SIGNIFICANT CHANGE UP (ref 3.8–10.5)
WBC # FLD AUTO: 4.5 K/UL — SIGNIFICANT CHANGE UP (ref 3.8–10.5)

## 2018-07-24 RX ORDER — SENNA PLUS 8.6 MG/1
2 TABLET ORAL AT BEDTIME
Qty: 0 | Refills: 0 | Status: DISCONTINUED | OUTPATIENT
Start: 2018-07-24 | End: 2018-07-25

## 2018-07-24 RX ORDER — DOCUSATE SODIUM 100 MG
100 CAPSULE ORAL DAILY
Qty: 0 | Refills: 0 | Status: DISCONTINUED | OUTPATIENT
Start: 2018-07-24 | End: 2018-07-25

## 2018-07-24 RX ORDER — SIMETHICONE 80 MG/1
80 TABLET, CHEWABLE ORAL ONCE
Qty: 0 | Refills: 0 | Status: COMPLETED | OUTPATIENT
Start: 2018-07-24 | End: 2018-07-24

## 2018-07-24 RX ADMIN — Medication 81 MILLIGRAM(S): at 12:01

## 2018-07-24 RX ADMIN — ATORVASTATIN CALCIUM 40 MILLIGRAM(S): 80 TABLET, FILM COATED ORAL at 21:52

## 2018-07-24 RX ADMIN — SIMETHICONE 80 MILLIGRAM(S): 80 TABLET, CHEWABLE ORAL at 03:48

## 2018-07-24 RX ADMIN — PANTOPRAZOLE SODIUM 40 MILLIGRAM(S): 20 TABLET, DELAYED RELEASE ORAL at 05:19

## 2018-07-24 RX ADMIN — SENNA PLUS 2 TABLET(S): 8.6 TABLET ORAL at 21:52

## 2018-07-24 NOTE — CHART NOTE - NSCHARTNOTEFT_GEN_A_CORE
I was paged by the nurse that patient is complaining of chest pain. I assessed the patient at the bedside, she was complaining of epigastric chest pain,  heaviness or pressing in character, resolved by movement. It was similar to what she had experienced in past likely atypical chest pain. Cardiac enzymes done in ED negative x 2.   Vital signs, /70 SPO2 100% HR 38 baseline HR is in 40's. Will monitor HR    She also complained of abdominal pain and constipation, didn't had a bowel movement in 3 days. Will give colace and senna.

## 2018-07-24 NOTE — PROGRESS NOTE ADULT - SUBJECTIVE AND OBJECTIVE BOX
PGY1 Note discussed with Supervising Resident and Primary Attending.    Patient is a 52y old  Female who presents with a chief complaint of chest pain (2018 12:56)      INTERVAL HPI/OVERNIGHT EVENTS : pt still c/o burning chest pain     MEDICATIONS  (STANDING):  aspirin  chewable 81 milliGRAM(s) Oral daily  atorvastatin 40 milliGRAM(s) Oral at bedtime  docusate sodium 100 milliGRAM(s) Oral daily  ergocalciferol 07501 Unit(s) Oral <User Schedule>  pantoprazole    Tablet 40 milliGRAM(s) Oral before breakfast  senna 2 Tablet(s) Oral at bedtime    MEDICATIONS  (PRN):  ALBUTerol/ipratropium for Nebulization 3 milliLiter(s) Nebulizer every 6 hours PRN Shortness of Breath and/or Wheezing  nitroglycerin     SubLingual 0.4 milliGRAM(s) SubLingual every 5 minutes PRN Chest Pain      Allergies    No Known Allergies    Intolerances        REVIEW OF SYSTEMS :  * CONSTITUTIONAL      : No Fever, Weight loss, or Fatigue  * EYES                             : No eye pain , Visual disturbances or Discharge  * RESPIRATORY             : No Cough, Wheezing, Chills or Hemoptysis; No shortness of breath  * CARDIOVASCULAR     : burning chest pain ,No  Palpitations, Dizziness, or Leg swelling  * GASTROINTESTINAL  : No Abdominal or Epigastric pain. No Nausea, Vomiting or Hematemesis; No Diarrhea or Constipation. No Melena or Hematochezia.  * GENITOURINARY        : No Dysuria , Frequency , Haematuria   * NEUROLOGICAL          : No Headaches, Memory loss, Loss of trength, Numbness, or Tremors  * MUSCULOSKELETAL   : No Joint pain  * PSYCHIATRY                 : No Depression or Anxiety   * HEME/LYMPH              : No Easy Bruising or Bleeding gums  * SKIN                               : No Itching, Burning, Rashes, or Lesions     Vital Signs Last 24 Hrs  T(C): 36.6 (2018 11:05), Max: 37.2 (2018 19:38)  T(F): 97.9 (2018 11:05), Max: 99 (2018 19:38)  HR: 48 (2018 11:05) (42 - 91)  BP: 110/58 (2018 11:05) (107/69 - 125/77)  BP(mean): --  RR: 16 (2018 11:05) (16 - 18)  SpO2: 99% (2018 11:05) (98% - 100%)    PHYSICAL EXAM :  * GENERAL                 : NAD, Well-groomed, Well-developed  * HEAD                       :  Atraumatic, Normocephalic  * EYES                         : EOMI, PERRLA, Conjunctiva and Sclera clear  * ENT                           : Moist Mucous Membranes  * NECK                         : Supple, No JVD, Normal Thyroid  * CHEST/LUNG           : Clear to Auscultation bilaterally; No Rales, Rhonchi, Wheezing or Rubs  * HEART                       : Regular Rate and Rhythm; No murmurs, Rubs or gallops  * ABDOMEN                : Soft, Non-tender, Non-distended; Bowel Sounds present  * NERVOUS SYSTEM  :  Alert & Oriented X3, Good Concentration; Motor Strength 5/5 B/L UL LL ; DTRs 2+ Intact and Symmetric  * EXTREMITIES            :  2+ Peripheral Pulses, No clubbing, cyanosis, or edema  * SKIN                           : No Rashes or Lesions    LABS:                          12.9   4.5   )-----------( 280      ( 2018 07:24 )             40.5     07-24    140  |  107  |  15  ----------------------------<  96  3.9   |  22  |  1.00    Ca    9.4      2018 07:24  Phos  4.1     07-23  Mg     2.3     07-23    TPro  7.8  /  Alb  3.6  /  TBili  0.8  /  DBili  x   /  AST  13  /  ALT  23  /  AlkPhos  81  07-23  tropnins x 3 : negative     Urinalysis Basic - ( 2018 12:07 )    Color: Yellow / Appearance: Clear / S.005 / pH: x  Gluc: x / Ketone: Negative  / Bili: Negative / Urobili: Negative   Blood: x / Protein: Negative / Nitrite: Negative   Leuk Esterase: Trace / RBC: 0-2 /HPF / WBC 0-2 /HPF   Sq Epi: x / Non Sq Epi: Occasional /HPF / Bacteria: x      CAPILLARY BLOOD GLUCOSE          RADIOLOGY & ADDITIONAL TESTS:   No radiological imaging was required    Imaging Personally Reviewed   :  [ ] YES  [ ] NO    Consultant(s) Notes Reviewed :  [ ] YES  [ ] NO

## 2018-07-24 NOTE — PROGRESS NOTE ADULT - ASSESSMENT
51 y/o F ambulatory pt, originally from [facility] with PMHx of Asthma and HLD and no significant PSHx presents to ED c/o intermittent episodes of CP x1 week. Pt describes chest pain as on and off, lasting 15mins to 1 hr, burning in nature,10/10 initially now 4/10,  located in the centre of the chest and sometimes radiating into her jaw and left shoulder, non aggravating  with inspiration and strenuous activity. Patient pinpoints tender spot on the centre of the chest. Pt sometimes feels SOB, palpitations but not associated with pain.she had this kind of pain before for which she got her cardiac workup done in [place] including ECHO and stress test which were normal as per the Pt(no records though).Her father  of MI at the age of 52.   EKG : NSR with bradycardia  Chest Xray : unremarkable   she was admitted to the hospital for chest pain/dizziness, troponin times two negative, pending ECHO, cardiology consult done, CTA ordered to rule out PE, continued on tele to monitor for bradycardia

## 2018-07-24 NOTE — PROGRESS NOTE ADULT - SUBJECTIVE AND OBJECTIVE BOX
Patient is a 52y old  Female who presents with a chief complaint of chest pain (22 Jul 2018 12:56)/dizzi      INTERVAL HPI/OVERNIGHT EVENTS:  T(C): 36.6 (07-24-18 @ 11:05), Max: 37.2 (07-23-18 @ 19:38)  HR: 48 (07-24-18 @ 11:05) (42 - 91)  BP: 110/58 (07-24-18 @ 11:05) (107/69 - 125/77)  RR: 16 (07-24-18 @ 11:05) (16 - 18)  SpO2: 99% (07-24-18 @ 11:05) (98% - 100%)  Wt(kg): --    LABS:                        12.9   4.5   )-----------( 280      ( 24 Jul 2018 07:24 )             40.5     07-24    140  |  107  |  15  ----------------------------<  96  3.9   |  22  |  1.00    Ca    9.4      24 Jul 2018 07:24  Phos  4.1     07-23  Mg     2.3     07-23    TPro  7.8  /  Alb  3.6  /  TBili  0.8  /  DBili  x   /  AST  13  /  ALT  23  /  AlkPhos  81  07-23        CAPILLARY BLOOD GLUCOSE            RADIOLOGY & ADDITIONAL TESTS:    Consultant(s) Notes Reviewed:  [x ] YES  [ ] NO    PHYSICAL EXAM:  GENERAL: well built, well nourished  HEAD:  Atraumatic, Normocephalic  EYES: EOMI, PERRLA, conjunctiva and sclera clear  ENT: No tonsillar erythema, exudates, or enlargement; Moist mucous membranes, Good dentition, No lesions  NECK: Supple, No JVD, Normal thyroid, no enlarged nodes  NERVOUS SYSTEM:  Alert & Oriented X3, Good concentration; Motor Strength 5/5 B/L upper and lower extremities; DTRs 2+ intact and symmetric, sensory intact  CHEST/LUNG: B/L good air entry; No rales, rhonchi, or wheezing  HEART: S1S2 mild sharee  ABDOMEN: Soft, Nontender, Nondistended; Bowel sounds present  EXTREMITIES:  2+ Peripheral Pulses, No clubbing, cyanosis, or edema  LYMPH: No lymphadenopathy noted  SKIN: No rashes or lesions    Care Discussed with Consultants/Other Providers [ x] YES  [ ] NO

## 2018-07-24 NOTE — PROGRESS NOTE ADULT - PROBLEM SELECTOR PLAN 1
-R/O ACS  -Atypical chest pain, burning ,chest tenderness,  -ECG NSR with bradycardia  tropnins x 3 : negative   - started on  ASA 81 mg, low dose lopressor, Lipitor  -TELE monitoring ; showed episodes of Tachy and Nikita   - ECHO to be followed

## 2018-07-24 NOTE — PROGRESS NOTE ADULT - SUBJECTIVE AND OBJECTIVE BOX
Patient still with episodes of atypical CP Review of systems otherwise (-)  	  MEDICATIONS:  MEDICATIONS  (STANDING):  aspirin  chewable 81 milliGRAM(s) Oral daily  atorvastatin 40 milliGRAM(s) Oral at bedtime  docusate sodium 100 milliGRAM(s) Oral daily  ergocalciferol 37194 Unit(s) Oral <User Schedule>  pantoprazole    Tablet 40 milliGRAM(s) Oral before breakfast  senna 2 Tablet(s) Oral at bedtime      LABS:	 	    CARDIAC MARKERS:  CARDIAC MARKERS ( 22 Jul 2018 06:01 )  <0.015 ng/mL / x     / 143 U/L / x     / 1.3 ng/mL  CARDIAC MARKERS ( 21 Jul 2018 23:38 )  <0.015 ng/mL / x     / x     / x     / x                                    12.9   4.5   )-----------( 280      ( 24 Jul 2018 07:24 )             40.5     Hemoglobin: 12.9 g/dL (07-24 @ 07:24)  Hemoglobin: 12.7 g/dL (07-23 @ 06:24)  Hemoglobin: 13.2 g/dL (07-21 @ 23:38)      07-24    140  |  107  |  15  ----------------------------<  96  3.9   |  22  |  1.00    Ca    9.4      24 Jul 2018 07:24  Phos  4.1     07-23  Mg     2.3     07-23    TPro  7.8  /  Alb  3.6  /  TBili  0.8  /  DBili  x   /  AST  13  /  ALT  23  /  AlkPhos  81  07-23    Creatinine Trend: 1.00<--, 1.04<--, 1.02<--, 1.09<--          PHYSICAL EXAM:  T(C): 36.6 (07-24-18 @ 07:16), Max: 37.2 (07-23-18 @ 19:38)  HR: 44 (07-24-18 @ 07:16) (42 - 91)  BP: 107/69 (07-24-18 @ 07:16) (107/69 - 125/77)  RR: 16 (07-24-18 @ 07:16) (16 - 18)  SpO2: 100% (07-24-18 @ 07:16) (98% - 100%)  Wt(kg): --  I&O's Summary    Height (cm): 172.72 (07-24 @ 01:25)  Weight (kg): 110.5 (07-24 @ 01:25)  BMI (kg/m2): 37 (07-24 @ 01:25)  BSA (m2): 2.22 (07-24 @ 01:25)    Gen: Appears well in NAD  HEENT:  (-)icterus (-)pallor  CV: N S1 S2 1/6 KIN (+)2 Pulses B/l  Resp:  Clear to ausculatation B/L, normal effort  GI: (+) BS Soft, NT, ND  Lymph:  (-)Edema, (-)obvious lymphadenopathy  Skin: Warm to touch, Normal turgor  Psych: Appropriate mood and affect    TELEMETRY: 	  sinus,sinus Nikita        ASSESSMENT/PLAN: 	52y Female history of dyslipidemia admitted with SOB and atypical CP R/o for MI    - F/u echo  - the patient had a plain treadmill stress in the past, if echo wnl plan for nuclear stress in AM      Kurt Khalil MD, FACC Patient still with episodes of atypical CP Review of systems otherwise (-)  	  MEDICATIONS:  MEDICATIONS  (STANDING):  aspirin  chewable 81 milliGRAM(s) Oral daily  atorvastatin 40 milliGRAM(s) Oral at bedtime  docusate sodium 100 milliGRAM(s) Oral daily  ergocalciferol 27496 Unit(s) Oral <User Schedule>  pantoprazole    Tablet 40 milliGRAM(s) Oral before breakfast  senna 2 Tablet(s) Oral at bedtime      LABS:	 	    CARDIAC MARKERS:  CARDIAC MARKERS ( 22 Jul 2018 06:01 )  <0.015 ng/mL / x     / 143 U/L / x     / 1.3 ng/mL  CARDIAC MARKERS ( 21 Jul 2018 23:38 )  <0.015 ng/mL / x     / x     / x     / x                                    12.9   4.5   )-----------( 280      ( 24 Jul 2018 07:24 )             40.5     Hemoglobin: 12.9 g/dL (07-24 @ 07:24)  Hemoglobin: 12.7 g/dL (07-23 @ 06:24)  Hemoglobin: 13.2 g/dL (07-21 @ 23:38)      07-24    140  |  107  |  15  ----------------------------<  96  3.9   |  22  |  1.00    Ca    9.4      24 Jul 2018 07:24  Phos  4.1     07-23  Mg     2.3     07-23    TPro  7.8  /  Alb  3.6  /  TBili  0.8  /  DBili  x   /  AST  13  /  ALT  23  /  AlkPhos  81  07-23    Creatinine Trend: 1.00<--, 1.04<--, 1.02<--, 1.09<--          PHYSICAL EXAM:  T(C): 36.6 (07-24-18 @ 07:16), Max: 37.2 (07-23-18 @ 19:38)  HR: 44 (07-24-18 @ 07:16) (42 - 91)  BP: 107/69 (07-24-18 @ 07:16) (107/69 - 125/77)  RR: 16 (07-24-18 @ 07:16) (16 - 18)  SpO2: 100% (07-24-18 @ 07:16) (98% - 100%)  Wt(kg): --  I&O's Summary    Height (cm): 172.72 (07-24 @ 01:25)  Weight (kg): 110.5 (07-24 @ 01:25)  BMI (kg/m2): 37 (07-24 @ 01:25)  BSA (m2): 2.22 (07-24 @ 01:25)    Gen: Appears well in NAD  HEENT:  (-)icterus (-)pallor  CV: N S1 S2 1/6 KIN (+)2 Pulses B/l  Resp:  Clear to ausculatation B/L, normal effort  GI: (+) BS Soft, NT, ND  Lymph:  (-)Edema, (-)obvious lymphadenopathy  Skin: Warm to touch, Normal turgor  Psych: Appropriate mood and affect    TELEMETRY: 	  sinus,sinus Nikita        ASSESSMENT/PLAN: 	52y Female history of dyslipidemia admitted with SOB and atypical CP R/o for MI    - F/u echo  - CTA of the chest r/o PE  - the patient had a plain treadmill stress in the past, if echo wnl plan for nuclear stress in AM      Kurt Khalil MD, FACC

## 2018-07-24 NOTE — PROGRESS NOTE ADULT - ASSESSMENT
52 yr old female admit hospital for chest pain/dizziness, troponin times two negative, pending ECHO, cardiology consult done, order CTA rule out PE, possible NST AM, continue tele monitor for bradycardia

## 2018-07-25 VITALS
DIASTOLIC BLOOD PRESSURE: 68 MMHG | TEMPERATURE: 98 F | HEART RATE: 51 BPM | RESPIRATION RATE: 16 BRPM | OXYGEN SATURATION: 98 % | SYSTOLIC BLOOD PRESSURE: 107 MMHG

## 2018-07-25 LAB
ALBUMIN SERPL ELPH-MCNC: 3.4 G/DL — LOW (ref 3.5–5)
ALP SERPL-CCNC: 82 U/L — SIGNIFICANT CHANGE UP (ref 40–120)
ALT FLD-CCNC: 19 U/L DA — SIGNIFICANT CHANGE UP (ref 10–60)
ANION GAP SERPL CALC-SCNC: 8 MMOL/L — SIGNIFICANT CHANGE UP (ref 5–17)
AST SERPL-CCNC: 11 U/L — SIGNIFICANT CHANGE UP (ref 10–40)
BASOPHILS # BLD AUTO: 0.1 K/UL — SIGNIFICANT CHANGE UP (ref 0–0.2)
BASOPHILS NFR BLD AUTO: 1.4 % — SIGNIFICANT CHANGE UP (ref 0–2)
BILIRUB SERPL-MCNC: 0.6 MG/DL — SIGNIFICANT CHANGE UP (ref 0.2–1.2)
BUN SERPL-MCNC: 11 MG/DL — SIGNIFICANT CHANGE UP (ref 7–18)
CALCIUM SERPL-MCNC: 9.2 MG/DL — SIGNIFICANT CHANGE UP (ref 8.4–10.5)
CHLORIDE SERPL-SCNC: 107 MMOL/L — SIGNIFICANT CHANGE UP (ref 96–108)
CO2 SERPL-SCNC: 26 MMOL/L — SIGNIFICANT CHANGE UP (ref 22–31)
CREAT SERPL-MCNC: 1.01 MG/DL — SIGNIFICANT CHANGE UP (ref 0.5–1.3)
EOSINOPHIL # BLD AUTO: 0.2 K/UL — SIGNIFICANT CHANGE UP (ref 0–0.5)
EOSINOPHIL NFR BLD AUTO: 3.5 % — SIGNIFICANT CHANGE UP (ref 0–6)
GLUCOSE SERPL-MCNC: 104 MG/DL — HIGH (ref 70–99)
HCT VFR BLD CALC: 40.2 % — SIGNIFICANT CHANGE UP (ref 34.5–45)
HGB BLD-MCNC: 12.5 G/DL — SIGNIFICANT CHANGE UP (ref 11.5–15.5)
LYMPHOCYTES # BLD AUTO: 2.2 K/UL — SIGNIFICANT CHANGE UP (ref 1–3.3)
LYMPHOCYTES # BLD AUTO: 43.3 % — SIGNIFICANT CHANGE UP (ref 13–44)
MAGNESIUM SERPL-MCNC: 2.1 MG/DL — SIGNIFICANT CHANGE UP (ref 1.6–2.6)
MCHC RBC-ENTMCNC: 26.8 PG — LOW (ref 27–34)
MCHC RBC-ENTMCNC: 31.1 GM/DL — LOW (ref 32–36)
MCV RBC AUTO: 86.1 FL — SIGNIFICANT CHANGE UP (ref 80–100)
MONOCYTES # BLD AUTO: 0.6 K/UL — SIGNIFICANT CHANGE UP (ref 0–0.9)
MONOCYTES NFR BLD AUTO: 11.1 % — SIGNIFICANT CHANGE UP (ref 2–14)
NEUTROPHILS # BLD AUTO: 2.1 K/UL — SIGNIFICANT CHANGE UP (ref 1.8–7.4)
NEUTROPHILS NFR BLD AUTO: 40.7 % — LOW (ref 43–77)
PHOSPHATE SERPL-MCNC: 4.6 MG/DL — HIGH (ref 2.5–4.5)
PLATELET # BLD AUTO: 247 K/UL — SIGNIFICANT CHANGE UP (ref 150–400)
POTASSIUM SERPL-MCNC: 3.9 MMOL/L — SIGNIFICANT CHANGE UP (ref 3.5–5.3)
POTASSIUM SERPL-SCNC: 3.9 MMOL/L — SIGNIFICANT CHANGE UP (ref 3.5–5.3)
PROT SERPL-MCNC: 7.5 G/DL — SIGNIFICANT CHANGE UP (ref 6–8.3)
RBC # BLD: 4.67 M/UL — SIGNIFICANT CHANGE UP (ref 3.8–5.2)
RBC # FLD: 11.9 % — SIGNIFICANT CHANGE UP (ref 10.3–14.5)
SODIUM SERPL-SCNC: 141 MMOL/L — SIGNIFICANT CHANGE UP (ref 135–145)
WBC # BLD: 5.2 K/UL — SIGNIFICANT CHANGE UP (ref 3.8–10.5)
WBC # FLD AUTO: 5.2 K/UL — SIGNIFICANT CHANGE UP (ref 3.8–10.5)

## 2018-07-25 PROCEDURE — 84481 FREE ASSAY (FT-3): CPT

## 2018-07-25 PROCEDURE — 82550 ASSAY OF CK (CPK): CPT

## 2018-07-25 PROCEDURE — 84484 ASSAY OF TROPONIN QUANT: CPT

## 2018-07-25 PROCEDURE — 71045 X-RAY EXAM CHEST 1 VIEW: CPT

## 2018-07-25 PROCEDURE — 80053 COMPREHEN METABOLIC PANEL: CPT

## 2018-07-25 PROCEDURE — 83735 ASSAY OF MAGNESIUM: CPT

## 2018-07-25 PROCEDURE — 85379 FIBRIN DEGRADATION QUANT: CPT

## 2018-07-25 PROCEDURE — 82306 VITAMIN D 25 HYDROXY: CPT

## 2018-07-25 PROCEDURE — 82553 CREATINE MB FRACTION: CPT

## 2018-07-25 PROCEDURE — 93005 ELECTROCARDIOGRAM TRACING: CPT

## 2018-07-25 PROCEDURE — 82746 ASSAY OF FOLIC ACID SERUM: CPT

## 2018-07-25 PROCEDURE — 85610 PROTHROMBIN TIME: CPT

## 2018-07-25 PROCEDURE — 82607 VITAMIN B-12: CPT

## 2018-07-25 PROCEDURE — 83036 HEMOGLOBIN GLYCOSYLATED A1C: CPT

## 2018-07-25 PROCEDURE — 99285 EMERGENCY DEPT VISIT HI MDM: CPT | Mod: 25

## 2018-07-25 PROCEDURE — 93017 CV STRESS TEST TRACING ONLY: CPT

## 2018-07-25 PROCEDURE — 85027 COMPLETE CBC AUTOMATED: CPT

## 2018-07-25 PROCEDURE — 80061 LIPID PANEL: CPT

## 2018-07-25 PROCEDURE — A9502: CPT

## 2018-07-25 PROCEDURE — 83880 ASSAY OF NATRIURETIC PEPTIDE: CPT

## 2018-07-25 PROCEDURE — 84702 CHORIONIC GONADOTROPIN TEST: CPT

## 2018-07-25 PROCEDURE — 84100 ASSAY OF PHOSPHORUS: CPT

## 2018-07-25 PROCEDURE — 84443 ASSAY THYROID STIM HORMONE: CPT

## 2018-07-25 PROCEDURE — 80048 BASIC METABOLIC PNL TOTAL CA: CPT

## 2018-07-25 PROCEDURE — 84439 ASSAY OF FREE THYROXINE: CPT

## 2018-07-25 PROCEDURE — 93306 TTE W/DOPPLER COMPLETE: CPT

## 2018-07-25 PROCEDURE — 78452 HT MUSCLE IMAGE SPECT MULT: CPT

## 2018-07-25 PROCEDURE — 81001 URINALYSIS AUTO W/SCOPE: CPT

## 2018-07-25 RX ORDER — ASPIRIN/CALCIUM CARB/MAGNESIUM 324 MG
1 TABLET ORAL
Qty: 30 | Refills: 0 | OUTPATIENT
Start: 2018-07-25 | End: 2018-08-23

## 2018-07-25 RX ORDER — ERGOCALCIFEROL 1.25 MG/1
1 CAPSULE ORAL
Qty: 4 | Refills: 0 | OUTPATIENT
Start: 2018-07-25 | End: 2018-08-23

## 2018-07-25 RX ORDER — PANTOPRAZOLE SODIUM 20 MG/1
1 TABLET, DELAYED RELEASE ORAL
Qty: 30 | Refills: 0 | OUTPATIENT
Start: 2018-07-25 | End: 2018-08-23

## 2018-07-25 RX ADMIN — PANTOPRAZOLE SODIUM 40 MILLIGRAM(S): 20 TABLET, DELAYED RELEASE ORAL at 06:31

## 2018-07-25 RX ADMIN — Medication 81 MILLIGRAM(S): at 12:33

## 2018-07-25 RX ADMIN — Medication 100 MILLIGRAM(S): at 12:33

## 2018-07-25 NOTE — DISCHARGE NOTE ADULT - CARE PLAN
Principal Discharge DX:	Chest pain  Goal:	to R/O ACS  Assessment and plan of treatment:	-Atypical chest pain, burning ,chest tenderness,  -ECG NSR with bradycardia  tropnins x 3 : negative   -TELE monitoring ; showed episodes of Tachy and Nikita   - ECHO : LVEF 50-55%,  - You need to continue on  ASA 81 mg  Secondary Diagnosis:	Asthma  Assessment and plan of treatment:	- H/o asthma, home use of inhaler as per needed Principal Discharge DX:	Chest pain  Goal:	to R/O ACS  Assessment and plan of treatment:	-Atypical chest pain, burning ,chest tenderness  -ECG NSR with bradycardia  tropnins x 3 : negative   -TELE monitoring ; showed episodes of Tachy and Nikita   - ECHO : LVEF 50-55%,  - You need to continue on  ASA 81 mg  - you had a negative blood test for clots (d-dimer)  -avoid AV maryjo blockers  follow up with cardiologist within 1 week and PCP  Secondary Diagnosis:	Asthma  Goal:	to have management of asthma  Assessment and plan of treatment:	- H/o asthma, home use of inhaler as per needed

## 2018-07-25 NOTE — PROGRESS NOTE ADULT - SUBJECTIVE AND OBJECTIVE BOX
pt seen and examined, no complaints on exam.   no events overnight    ALBUTerol/ipratropium for Nebulization 3 milliLiter(s) Nebulizer every 6 hours PRN  aspirin  chewable 81 milliGRAM(s) Oral daily  atorvastatin 40 milliGRAM(s) Oral at bedtime  docusate sodium 100 milliGRAM(s) Oral daily  ergocalciferol 31337 Unit(s) Oral <User Schedule>  nitroglycerin     SubLingual 0.4 milliGRAM(s) SubLingual every 5 minutes PRN  pantoprazole    Tablet 40 milliGRAM(s) Oral before breakfast  senna 2 Tablet(s) Oral at bedtime                            12.9   4.5   )-----------( 280      ( 24 Jul 2018 07:24 )             40.5       Hemoglobin: 12.9 g/dL (07-24 @ 07:24)  Hemoglobin: 12.7 g/dL (07-23 @ 06:24)  Hemoglobin: 13.2 g/dL (07-21 @ 23:38)      07-24    140  |  107  |  15  ----------------------------<  96  3.9   |  22  |  1.00    Ca    9.4      24 Jul 2018 07:24  Phos  4.1     07-23  Mg     2.3     07-23    TPro  7.8  /  Alb  3.6  /  TBili  0.8  /  DBili  x   /  AST  13  /  ALT  23  /  AlkPhos  81  07-23    Creatinine Trend: 1.00<--, 1.04<--, 1.02<--, 1.09<--    COAGS:     CARDIAC MARKERS ( 22 Jul 2018 06:01 )  <0.015 ng/mL / x     / 143 U/L / x     / 1.3 ng/mL        T(C): 37 (07-25-18 @ 04:51), Max: 37 (07-25-18 @ 04:51)  HR: 59 (07-25-18 @ 04:51) (44 - 67)  BP: 109/48 (07-25-18 @ 04:51) (107/69 - 120/69)  RR: 18 (07-25-18 @ 04:51) (16 - 18)  SpO2: 99% (07-25-18 @ 04:51) (99% - 100%)  Wt(kg): --    I&O's Summary    Gen: Appears well in NAD  HEENT:  (-)icterus (-)pallor  CV: N S1 S2 1/6 KIN (+)2 Pulses B/l  Resp:  Clear to ausculatation B/L, normal effort  GI: (+) BS Soft, NT, ND  Lymph:  (-)Edema, (-)obvious lymphadenopathy  Skin: Warm to touch, Normal turgor  Psych: Appropriate mood and affect    TELEMETRY: 	sinus Nikita  50-60   echo:     ASSESSMENT/PLAN: 	52y Female history of dyslipidemia admitted with SOB and atypical CP R/o for MI    cardiac markers neg    F/u echo   CTA of the chest r/o PE  ASA, statin ,   tele stable - hold BB at present .   ** the patient had a plain treadmill stress in the past, if echo wnl plan for nuclear stress in AM  D/W Dr Khalil pt seen and examined, no complaints on exam.   no events overnight    ALBUTerol/ipratropium for Nebulization 3 milliLiter(s) Nebulizer every 6 hours PRN  aspirin  chewable 81 milliGRAM(s) Oral daily  atorvastatin 40 milliGRAM(s) Oral at bedtime  docusate sodium 100 milliGRAM(s) Oral daily  ergocalciferol 30685 Unit(s) Oral <User Schedule>  nitroglycerin     SubLingual 0.4 milliGRAM(s) SubLingual every 5 minutes PRN  pantoprazole    Tablet 40 milliGRAM(s) Oral before breakfast  senna 2 Tablet(s) Oral at bedtime                            12.9   4.5   )-----------( 280      ( 24 Jul 2018 07:24 )             40.5       Hemoglobin: 12.9 g/dL (07-24 @ 07:24)  Hemoglobin: 12.7 g/dL (07-23 @ 06:24)  Hemoglobin: 13.2 g/dL (07-21 @ 23:38)      07-24    140  |  107  |  15  ----------------------------<  96  3.9   |  22  |  1.00    Ca    9.4      24 Jul 2018 07:24  Phos  4.1     07-23  Mg     2.3     07-23    TPro  7.8  /  Alb  3.6  /  TBili  0.8  /  DBili  x   /  AST  13  /  ALT  23  /  AlkPhos  81  07-23    Creatinine Trend: 1.00<--, 1.04<--, 1.02<--, 1.09<--    COAGS:     CARDIAC MARKERS ( 22 Jul 2018 06:01 )  <0.015 ng/mL / x     / 143 U/L / x     / 1.3 ng/mL        T(C): 37 (07-25-18 @ 04:51), Max: 37 (07-25-18 @ 04:51)  HR: 59 (07-25-18 @ 04:51) (44 - 67)  BP: 109/48 (07-25-18 @ 04:51) (107/69 - 120/69)  RR: 18 (07-25-18 @ 04:51) (16 - 18)  SpO2: 99% (07-25-18 @ 04:51) (99% - 100%)  Wt(kg): --    I&O's Summary    Gen: Appears well in NAD  HEENT:  (-)icterus (-)pallor  CV: N S1 S2 1/6 KIN (+)2 Pulses B/l  Resp:  Clear to ausculatation B/L, normal effort  GI: (+) BS Soft, NT, ND  Lymph:  (-)Edema, (-)obvious lymphadenopathy  Skin: Warm to touch, Normal turgor  Psych: Appropriate mood and affect    TELEMETRY: 	sinus Nikita  50-60   echo:     ASSESSMENT/PLAN: 	52y Female history of dyslipidemia admitted with SOB and atypical CP R/o for MI    cardiac markers neg    F/u echo  ASA, statin ,   tele stable - hold BB at present .   ** the patient had a plain treadmill stress in the past, if echo wnl plan for nuclear stress in AM  D/W Dr Khalil

## 2018-07-25 NOTE — PROGRESS NOTE ADULT - SUBJECTIVE AND OBJECTIVE BOX
Patient is a 52y old  Female who presents with a chief complaint of chest pain (2018 12:56)/dizziness, pending NST, ECHO LVEF 50-55%      INTERVAL HPI/OVERNIGHT EVENTS:  T(C): 37 (18 @ 07:35), Max: 37 (18 @ 04:51)  HR: 59 (18 @ 07:35) (47 - 67)  BP: 120/59 (18 @ 07:35) (109/48 - 120/69)  RR: 16 (18 @ 07:35) (16 - 18)  SpO2: 100% (18 @ 07:35) (99% - 100%)  Wt(kg): --    LABS:                        12.5   5.2   )-----------( 247      ( 2018 06:13 )             40.2     07-    141  |  107  |  11  ----------------------------<  104<H>  3.9   |  26  |  1.01    Ca    9.2      2018 06:13  Phos  4.6     -  Mg     2.1     -    TPro  7.5  /  Alb  3.4<L>  /  TBili  0.6  /  DBili  x   /  AST  11  /  ALT  19  /  AlkPhos  82  07-25      Urinalysis Basic - ( 2018 12:07 )    Color: Yellow / Appearance: Clear / S.005 / pH: x  Gluc: x / Ketone: Negative  / Bili: Negative / Urobili: Negative   Blood: x / Protein: Negative / Nitrite: Negative   Leuk Esterase: Trace / RBC: 0-2 /HPF / WBC 0-2 /HPF   Sq Epi: x / Non Sq Epi: Occasional /HPF / Bacteria: x      CAPILLARY BLOOD GLUCOSE            RADIOLOGY & ADDITIONAL TESTS:  < from: Transthoracic Echocardiogram (18 @ 06:57) >  CONCLUSIONS:  1. Normal mitral valve.  2. Normal trileaflet aortic valve.  3. Aortic Root: 3.2 cm.  4. Normal left atrium.  5. Normal left ventricular internal dimensions and wall  thicknesses.  6. Normal Left Ventricular Systolic Function,  (EF =  50-55%)  7. Grade II diastolic dysfunction.  8. Normal right atrium.  9. Normal right ventricular size and function.  10. Unable to estimate RVSP.  11. There is mild tricuspid regurgitation.  12. There is mild pulmonic regurgitation.  13. Normal pericardium with no pericardial effusion.    < end of copied text >    Consultant(s) Notes Reviewed:  [x ] YES  [ ] NO    PHYSICAL EXAM:  GENERAL: well built, well nourished  HEAD:  Atraumatic, Normocephalic  EYES: EOMI, PERRLA, conjunctiva and sclera clear  ENT: No tonsillar erythema, exudates, or enlargement; Moist mucous membranes, Good dentition, No lesions  NECK: Supple, No JVD, Normal thyroid, no enlarged nodes  NERVOUS SYSTEM:  Alert & Oriented X3, Good concentration; Motor Strength 5/5 B/L upper and lower extremities; DTRs 2+ intact and symmetric, sensory intact  CHEST/LUNG: B/L good air entry; No rales, rhonchi, or wheezing  HEART: S1S2 mild sharee  ABDOMEN: Soft, Nontender, Nondistended; Bowel sounds present  EXTREMITIES:  2+ Peripheral Pulses, No clubbing, cyanosis, or edema  LYMPH: No lymphadenopathy noted  SKIN: No rashes or lesions    Care Discussed with Consultants/Other Providers [ x] YES  [ ] NO

## 2018-07-25 NOTE — DISCHARGE NOTE ADULT - PLAN OF CARE
to R/O ACS -Atypical chest pain, burning ,chest tenderness,  -ECG NSR with bradycardia  tropnins x 3 : negative   -TELE monitoring ; showed episodes of Tachy and Nikita   - ECHO : LVEF 50-55%,  - You need to continue on  ASA 81 mg - H/o asthma, home use of inhaler as per needed -Atypical chest pain, burning ,chest tenderness  -ECG NSR with bradycardia  tropnins x 3 : negative   -TELE monitoring ; showed episodes of Tachy and Nikita   - ECHO : LVEF 50-55%,  - You need to continue on  ASA 81 mg  - you had a negative blood test for clots (d-dimer)  -avoid AV maryjo blockers  follow up with cardiologist within 1 week and PCP to have management of asthma

## 2018-07-25 NOTE — DISCHARGE NOTE ADULT - PATIENT PORTAL LINK FT
You can access the [email or web address] Patient Portal, offered by [facility], by registering with the following website: [email or web address]

## 2018-07-25 NOTE — DISCHARGE NOTE ADULT - MEDICATION SUMMARY - MEDICATIONS TO TAKE
I will START or STAY ON the medications listed below when I get home from the hospital:    aspirin 81 mg oral tablet, chewable  -- 1 tab(s) by mouth once a day  -- Indication: For Chest pain    pantoprazole 40 mg oral delayed release tablet  -- 1 tab(s) by mouth once a day (before a meal)  -- Indication: For gerd    Drisdol 50,000 intl units (1.25 mg) oral capsule  -- 1 cap(s) by mouth every 7 days   -- Indication: For Supplement

## 2018-07-25 NOTE — DISCHARGE NOTE ADULT - HOSPITAL COURSE
51 y/o F ambulatory pt, originally from [facility] with PMHx of Asthma and HLD and no significant PSHx presents to ED c/o intermittent episodes of CP x1 week. Pt describes chest pain as on and off, lasting 15mins to 1 hr, burning in nature,10/10 initially now 4/10,  located in the centre of the chest and sometimes radiating into her jaw and left shoulder, non aggravating  with inspiration and strenuous activity. Patient pinpoints tender spot on the centre of the chest. Pt sometimes feels SOB, palpitations but not associated with pain.she had this kind of pain before for which she got her cardiac workup done in [place] including ECHO and stress test which were normal as per the Pt(no records though).Her father  of MI at the age of 52.   EKG : NSR with bradycardia  Chest Xray : unremarkable   she was admitted to the hospital for chest pain/dizziness, troponin times two negative , cardiology consult done, CTA ordered to rule out PE then could not find veins for IV access so Done with D-Dimers which came negative ,  ECHO LVEF 50-55%, NST ,bradycardia improved. 51 y/o F ambulatory pt, originally from [facility] with PMHx of Asthma and HLD and no significant PSHx presents to ED c/o intermittent episodes of CP x1 week. Pt describes chest pain as on and off, lasting 15mins to 1 hr, burning in nature,10/10 initially now 4/10,  located in the centre of the chest and sometimes radiating into her jaw and left shoulder, non aggravating  with inspiration and strenuous activity. Patient pinpoints tender spot on the centre of the chest. Pt sometimes feels SOB, palpitations but not associated with pain.she had this kind of pain before for which she got her cardiac workup done in [place] including ECHO and stress test which were normal as per the Pt(no records though).Her father  of MI at the age of 52.   EKG : NSR with bradycardia  Chest Xray : unremarkable   she was admitted to the hospital for chest pain/dizziness, troponin times two negative , cardiology consult done, CTA ordered to rule out PE then could not find veins for IV access so Done with D-Dimers which came negative ,  ECHO LVEF 50-55%, NST ,bradycardia improved.  Stable for discharge.

## 2018-07-25 NOTE — PROGRESS NOTE ADULT - ASSESSMENT
52 yr old female admit hospital for chest pain/dizziness, troponin times two negative,  ECHO LVEF 50-55%, NST today follow result, if negative D/C home avoid BB blocker, bradycardia improved.

## 2018-07-25 NOTE — PROGRESS NOTE ADULT - ATTENDING COMMENTS
Patient seen and examined.  Agree with above. -  -avoid avn blocking agents for sinus bradycardia with narrow qrs  -check tte  -obtain prior nst report  -GI workup per primary team  -possible ischemic eval pending above    Anshu Price MD
Patient seen and examined, agree with above assessment and plan as transcribed above.    - Echo with normal LV function  - D dimmer (-) no need for CTPA  - No ischemia on stress  - no pertinent findings on tele  - Can D/C tele  - No need for further inpatient cardiac work up.  - D/C planning per med  - I will sign off please call back if needed    Kurt Khalil MD, FACC

## 2022-04-21 NOTE — CONSULT NOTE ADULT - CONSULT REQUESTED BY NAME
"     Patient ID: Jannet Fierro is a 65 y.o. female.  Right shoulder pain  1/5/22 right shoulder arthroscopy supraspinatus repair  Pain improving    Review of Systems:  Right shoulder pain improving      Objective:    /73   Pulse 80   Ht 162.6 cm (64\")   Wt 103 kg (227 lb)   LMP  (LMP Unknown)   BMI 38.96 kg/m²     Physical Examination:  Shoulder passive elevation 160 abduction 130 external rotation 40 with intact repair       Imaging:       Assessment:    Doing well after cuff repair    Plan:   Gradual activity as tolerated see me as needed, if symptoms do not improve as far as range of motion consider steroid injection in 6 weeks      Procedures          Disclaimer: Part of this note may be an electronic transcription/translation of spoken language to printed text using the Dragon Dictation System  " Dr. Paul

## 2023-04-13 NOTE — PATIENT PROFILE ADULT. - NS PRO OT REFERRAL QUES 1 YN
Subjective:       Patient ID: Gasper Li is a 37 y.o. female.    Chief Complaint: Annual Exam    37 yr old pleasant white female with anxiety/stress, GERD, allergies,  and no other chronic medical history presents today for annual wellness check and lab work. She also need medication refill. She has daily allergies and uses allegra daily. Still gets itching after shower and sometimes during the day and she also uses benadryl sometimes. She would like to see allergist.        Anxiety/depression - chronic - onset 6 years ago - mostly stress from work - she is on viibryd and doing well- no side effects - need med refill - no SI/HI/Hallucinations/delusions    GERD - controlled bon ppi    She works as Nurse at [employer] and is  and has 2 children      She eats healthy and does some exercise      History as below - reviewed      Health maintenance  -labs due  -pap and vaccines UTD        Gastroesophageal Reflux  She reports no wheezing.   Anxiety  Presents for follow-up visit. Onset was 1 to 5 years ago. Patient reports no compulsions, confusion, decreased concentration, dizziness, hyperventilation, irritability, nervous/anxious behavior, palpitations, panic, shortness of breath or suicidal ideas. Symptoms occur occasionally. The severity of symptoms is mild. Nothing aggravates the symptoms. The quality of sleep is fair. Nighttime awakenings: occasional.     There are no known risk factors. Her past medical history is significant for anxiety/panic attacks. There is no history of anemia, arrhythmia, asthma, bipolar disorder, CAD, chronic lung disease, depression, fibromyalgia or suicide attempts. Past treatments include SSRIs. The treatment provided significant relief. Compliance with prior treatments has been good.   Review of Systems   Constitutional: Negative.  Negative for activity change, irritability and unexpected weight change.   HENT: Negative.  Negative for ear discharge, hearing loss,  rhinorrhea, trouble swallowing and voice change.    Eyes: Negative.  Negative for pain, discharge and visual disturbance.   Respiratory: Negative.  Negative for chest tightness, shortness of breath and wheezing.    Cardiovascular: Negative.  Negative for palpitations.   Gastrointestinal: Negative.  Negative for abdominal distention, anal bleeding, blood in stool, constipation and diarrhea.   Endocrine: Negative.  Negative for cold intolerance, polydipsia and polyuria.   Genitourinary: Negative.  Negative for decreased urine volume, difficulty urinating, dysuria, frequency, hematuria, menstrual problem and vaginal pain.   Musculoskeletal:  Negative for gait problem.   Integumentary:  Positive for mole/lesion. Negative for color change, pallor and wound. Negative.   Allergic/Immunologic: Negative.  Negative for environmental allergies and immunocompromised state.   Neurological:  Negative for dizziness, tremors, seizures and speech difficulty.   Hematological: Negative.  Negative for adenopathy. Does not bruise/bleed easily.   Psychiatric/Behavioral:  Positive for sleep disturbance. Negative for agitation, confusion, decreased concentration, dysphoric mood, hallucinations, self-injury and suicidal ideas. The patient is not nervous/anxious.        PMH/PSH/FH/SH/MED/ALLERGY reviewed  Past Medical History:   Diagnosis Date    Allergy     Anxiety     Anxiety     Chronic tension headaches     CMV (cytomegalovirus infection)     Dermatographia     EBV infection     GERD (gastroesophageal reflux disease)     Vitamin D deficiency        Past Surgical History:   Procedure Laterality Date    ESOPHAGOGASTRODUODENOSCOPY N/A 2/5/2020    Procedure: EGD (ESOPHAGOGASTRODUODENOSCOPY);  Surgeon: Rogerio Becker MD;  Location: [facility];  Service: Endoscopy;  Laterality: N/A;    LYMPH NODE BIOPSY      TONSILLECTOMY         Family History   Problem Relation Age of Onset    Hyperlipidemia Father     Hypertension Father      Diabetes Father     Cirrhosis Father     Hypertension Mother     Hyperlipidemia Mother     Heart disease Paternal Grandmother     Cancer Other         lung cancer       Social History     Socioeconomic History    Marital status:    Occupational History     Employer: [employer]   Tobacco Use    Smoking status: Former     Types: Cigarettes     Quit date: 2006     Years since quittin.2     Passive exposure: Never    Smokeless tobacco: Never   Substance and Sexual Activity    Alcohol use: Yes     Comment: occ    Drug use: No    Sexual activity: Yes     Partners: Male     Birth control/protection: I.U.D.       Current Outpatient Medications   Medication Sig Dispense Refill    famotidine (PEPCID) 20 MG tablet       fexofenadine (ALLEGRA) 180 MG tablet Take 1 tablet (180 mg total) by mouth once daily. 90 tablet 3    multivitamin with minerals (MULTIPLE VITAMIN-MINERALS ORAL) as directed      norgestimate-ethinyl estradioL (ORTHO-CYCLEN) 0.25-35 mg-mcg per tablet Take 1 tablet by mouth once daily. 30 tablet 0    pantoprazole (PROTONIX) 40 MG tablet Take 1 tablet (40 mg total) by mouth once daily. 90 tablet 3    vilazodone (VIIBRYD) 20 mg Tab Take 20 mg by mouth once daily.      levonorgestrel (MIRENA) 20 mcg/24 hr (5 years) IUD 1 each by Intrauterine route once.      phentermine (ADIPEX-P) 37.5 mg tablet Take 1 tablet (37.5 mg total) by mouth before breakfast. 30 tablet 2    triamcinolone acetonide 0.1% (KENALOG) 0.1 % cream Apply topically 2 (two) times daily. 15 g 1     No current facility-administered medications for this visit.       Review of patient's allergies indicates:  No Known Allergies      Objective:       Vitals:    23 1323   BP: 120/70   Pulse: 70   Temp: 98 °F (36.7 °C)       Physical Exam  Constitutional:       General: She is not in acute distress.     Appearance: She is well-developed. She is not diaphoretic.   HENT:      Head: Normocephalic and atraumatic.      Right  Ear: External ear normal.      Left Ear: External ear normal.      Nose: Nose normal.      Mouth/Throat:      Pharynx: No oropharyngeal exudate.   Eyes:      General: No scleral icterus.        Right eye: No discharge.         Left eye: No discharge.      Conjunctiva/sclera: Conjunctivae normal.      Pupils: Pupils are equal, round, and reactive to light.   Neck:      Thyroid: No thyromegaly.      Vascular: No JVD.      Trachea: No tracheal deviation.   Cardiovascular:      Rate and Rhythm: Normal rate and regular rhythm.      Heart sounds: Normal heart sounds. No murmur heard.    No friction rub. No gallop.   Pulmonary:      Effort: Pulmonary effort is normal.      Breath sounds: Normal breath sounds. No stridor. No wheezing or rales.   Chest:      Chest wall: No tenderness.   Abdominal:      General: Bowel sounds are normal. There is no distension.      Palpations: Abdomen is soft. There is no mass.      Tenderness: There is no abdominal tenderness. There is no guarding or rebound.      Hernia: No hernia is present.   Musculoskeletal:         General: No tenderness. Normal range of motion.      Cervical back: Normal range of motion and neck supple.   Lymphadenopathy:      Cervical: No cervical adenopathy.   Skin:     General: Skin is warm and dry.      Coloration: Skin is not pale.      Findings: No erythema or rash.   Neurological:      Mental Status: She is alert and oriented to person, place, and time.      Cranial Nerves: No cranial nerve deficit.      Motor: No abnormal muscle tone.      Coordination: Coordination normal.      Deep Tendon Reflexes: Reflexes are normal and symmetric. Reflexes normal.   Psychiatric:         Behavior: Behavior normal.         Thought Content: Thought content normal.         Judgment: Judgment normal.       Assessment:       Problem List Items Addressed This Visit       ANA (obstructive sleep apnea)    Relevant Orders    TSH    Obesity (BMI 30-39.9)    Relevant Medications     phentermine (ADIPEX-P) 37.5 mg tablet    GERD (gastroesophageal reflux disease)    Anxiety    Relevant Orders    TSH    Allergy    Relevant Orders    Ambulatory referral/consult to Allergy    Allergic dermatitis    Relevant Medications    triamcinolone acetonide 0.1% (KENALOG) 0.1 % cream    Other Relevant Orders    Ambulatory referral/consult to Allergy     Other Visit Diagnoses       Routine general medical examination at health care facility    -  Primary    Relevant Orders    CBC Auto Differential    Comprehensive Metabolic Panel    Lipid Panel    TSH    Vitamin D            Plan:           Gasper was seen today for annual exam.    Diagnoses and all orders for this visit:    Routine general medical examination at health care facility  -     CBC Auto Differential; Future  -     Comprehensive Metabolic Panel; Future  -     Lipid Panel; Future  -     TSH; Future  -     Vitamin D; Future    Obesity (BMI 30-39.9)  -     phentermine (ADIPEX-P) 37.5 mg tablet; Take 1 tablet (37.5 mg total) by mouth before breakfast.    Allergic dermatitis  -     triamcinolone acetonide 0.1% (KENALOG) 0.1 % cream; Apply topically 2 (two) times daily.  -     Ambulatory referral/consult to Allergy; Future    Allergy, initial encounter  -     Ambulatory referral/consult to Allergy; Future    Anxiety  -     TSH; Future    ANA (obstructive sleep apnea)  -     TSH; Future    Gastroesophageal reflux disease, unspecified whether esophagitis present      Wellness check  -normal exam  -labs    Anxiety  -controlled  -TSh  -refilled meds    Dermato graphia/allergy  -seen allergist  -on zantac and allegra OTC daily    Allergies  -continue allegra  -refer allergy specialist      GERD  -ppi daily    Spent adequate time in obtaining history and explaining differentials      Follow up in about 1 year (around 4/12/2024), or if symptoms worsen or fail to improve.       no

## 2024-01-05 NOTE — H&P ADULT - NEUROLOGICAL
Patient was wondering if you could call her regarding a rash she has. Forgot to ask you at her appointment last week. Please call.    detailed exam